# Patient Record
Sex: FEMALE | Race: WHITE | NOT HISPANIC OR LATINO | Employment: FULL TIME | URBAN - METROPOLITAN AREA
[De-identification: names, ages, dates, MRNs, and addresses within clinical notes are randomized per-mention and may not be internally consistent; named-entity substitution may affect disease eponyms.]

---

## 2018-01-04 ENCOUNTER — OFFICE VISIT (OUTPATIENT)
Dept: OTOLARYNGOLOGY | Facility: CLINIC | Age: 43
End: 2018-01-04
Payer: COMMERCIAL

## 2018-01-04 VITALS
TEMPERATURE: 99 F | DIASTOLIC BLOOD PRESSURE: 81 MMHG | HEART RATE: 83 BPM | WEIGHT: 121.69 LBS | SYSTOLIC BLOOD PRESSURE: 143 MMHG

## 2018-01-04 DIAGNOSIS — H93.291 ABNORMAL AUDITORY PERCEPTION OF RIGHT EAR: Primary | ICD-10-CM

## 2018-01-04 DIAGNOSIS — H91.90 PERCEIVED HEARING CHANGES: ICD-10-CM

## 2018-01-04 PROCEDURE — 99203 OFFICE O/P NEW LOW 30 MIN: CPT | Mod: S$GLB,,, | Performed by: PHYSICIAN ASSISTANT

## 2018-01-04 PROCEDURE — 99999 PR PBB SHADOW E&M-EST. PATIENT-LVL III: CPT | Mod: PBBFAC,,, | Performed by: PHYSICIAN ASSISTANT

## 2018-01-04 RX ORDER — ALBUTEROL SULFATE 90 UG/1
2 AEROSOL, METERED RESPIRATORY (INHALATION)
COMMUNITY
Start: 2017-08-17 | End: 2022-04-05

## 2018-01-04 RX ORDER — DEXTROAMPHETAMINE SACCHARATE, AMPHETAMINE ASPARTATE MONOHYDRATE, DEXTROAMPHETAMINE SULFATE AND AMPHETAMINE SULFATE 5; 5; 5; 5 MG/1; MG/1; MG/1; MG/1
20 CAPSULE, EXTENDED RELEASE ORAL 2 TIMES DAILY
COMMUNITY
Start: 2017-12-08 | End: 2020-06-25

## 2018-01-04 NOTE — PROGRESS NOTES
Subjective:   Patient: Germaine Gomez [id number], :1975   Visit date:2018 4:04 PM    Chief Complaint:  Ear Fullness (Right ear, some hearing difficulties also)    HPI:  Germaine FLOOD is a 42 y.o. female who is here to see me today for the first time for evaluation of a sloshing sound in her right ear, intermittent for the past 4-6 weeks.  She says she usually notices it when she gets out of bed during the night to go to bathroom.  It lasts only a few seconds.  She's concerned that she has fluid in her ear.  She had wax flushed from her right ear about 2 months ago.  Denies tinnitus or ringing.  She denies hearing loss but says she asks people to repeat themselves often (for over 6 months now) and she's unsure if this is just a habit.  Denies difference in hearing between her ears.  Denies dizziness currently but had severe episode about 12-18 months ago .  Denies previous otologic surgery.  Denies family history of hearing loss.  Denies loud noise exposure.  She denies grinding or clenching her teeth, but notes that her jaw pops when she opens her mouth for many years now.  Denies jaw pain.        Review of Systems:  Negative unless checked off.  Gen:  []fever   []fatigue  HENT:  []nosebleeds  [x]dental problem - jaw pops when she opens her mouth  Eyes:  []photophobia  []visual disturbance  Resp:  []chest tightness []wheezing  Card:  []chest pain  []leg swelling  GI:  []abdominal pain []blood in stool  :  []dysuria  []hematuria  Musc:  []joint swelling  []gait problem  Skin:  []color change  []pallor  Neuro:  []seizures  []numbness  Hem:  []bruise/bleed easily  Psych:  []hallucinations  []behavioral problems  Allergy/Imm: is allergic to bactrim [sulfamethoxazole-trimethoprim].    Her meds, allergies, medical, surgical, social & family histories were reviewed & updated:  -     She has a current medication list which includes the following prescription(s): albuterol and dextroamphetamine-amphetamine.  -     She   has no past medical history on file.   -     She  does not have a problem list on file.   -     She  has a past surgical history that includes Breast implants and Tummy tuck.  -     She  reports that she quit smoking about 20 years ago. She has never used smokeless tobacco. She reports that she drinks alcohol.  -     Her family history includes Hypertension in her mother.  -     She is allergic to bactrim [sulfamethoxazole-trimethoprim].    Objective:     Physical Exam:  Vitals:  BP (!) 143/81 (BP Location: Left arm)   Pulse 83   Temp 98.7 °F (37.1 °C) (Tympanic)   Wt 55.2 kg (121 lb 11.1 oz)   General appearance:  Well developed, well nourished    Eyes:  Extraocular motions intact, PERRL    Communication:  no hoarseness, no dysphonia    Ears:  Otoscopy of external auditory canals and tympanic membranes was normal, clinical speech reception thresholds grossly intact, no mass/lesion of auricle.  No middle ear effusions.  Nose:  No masses/lesions of external nose, nasal mucosa, septum, and turbinates were within normal limits.  Mouth:  No mass/lesion of lips, teeth, gums, hard/soft palate, tongue, tonsils, or oropharynx.    Cardiovascular:  Radial Pulses +2     Neck & Lymphatics:  No cervical lymphadenopathy, no neck mass/crepitus/ asymmetry, trachea is midline, no thyroid enlargement/tenderness/mass.    Psych: Oriented x3,  Alert with normal mood and affect.     Respiration/Chest:  Symmetric expansion during respiration, normal respiratory effort.    Skin:  Warm and intact. No ulcerations of face, scalp, neck.        Tympanograms today:  Left 1.0 mL @ -9 daPa, ECV 1.3 mL  Right 0.9 mL @ -9 daPa, ECV 1.2 mL      Assessment & Plan:   Germaine FLOOD was seen today for ear fullness.    Diagnoses and all orders for this visit:    Abnormal auditory perception of right ear    Perceived hearing changes      Reassured patient that her ear exam today is normal and her tympanograms are also normal.  No middle ear fluid and no  signs of otitis externa.  Recommend scheduling an audiogram at her convenience and I'll review those results once available.  Discussed trial of low salt, low caffeine.  She may benefit from EcoG if persists or has any additional dizziness.      We discussed her medical conditions, treatments and plan.  eGrmaine FLOOD should return to clinic if any issues arise (symptoms worsen or persist), otherwise we will see her back in the clinic only as needed.

## 2018-01-15 ENCOUNTER — CLINICAL SUPPORT (OUTPATIENT)
Dept: AUDIOLOGY | Facility: CLINIC | Age: 43
End: 2018-01-15
Payer: COMMERCIAL

## 2018-01-15 DIAGNOSIS — H92.01 OTALGIA OF RIGHT EAR: Primary | ICD-10-CM

## 2018-01-15 PROCEDURE — 92557 COMPREHENSIVE HEARING TEST: CPT | Mod: S$GLB,,, | Performed by: AUDIOLOGIST

## 2018-01-15 PROCEDURE — 92567 TYMPANOMETRY: CPT | Mod: S$GLB,,, | Performed by: AUDIOLOGIST

## 2018-01-15 NOTE — PROGRESS NOTES
Germaine Gomez was seen 01/15/2018 for an audiological evaluation.  Patient complains of otalgia and intermitting swishing sound in her right ear for the past two months.  She denies hearing loss and dizziness.  She was referred for audiogram by ARCHIE Hughes.    Results reveal normal hearing sensitivity 250-8000 Hz bilaterally. Bilateral air/bone gap was detected in the low frequencies.  Speech Reception Thresholds were  10 dBHL for the right ear and 10 dBHL for the left ear.   Word recognition scores were excellent bilaterally.  Tympanograms were Type A for the right ear and Type A for the left ear.    Patient was counseled on the above findings.    REC:  ENT review of audiogram

## 2018-01-16 ENCOUNTER — TELEPHONE (OUTPATIENT)
Dept: OTOLARYNGOLOGY | Facility: CLINIC | Age: 43
End: 2018-01-16

## 2018-01-16 NOTE — TELEPHONE ENCOUNTER
----- Message from Sabine Forbes sent at 1/16/2018 12:54 PM CST -----  Contact: pt   Pt is calling to speak with the nurse to get the results of her hearing test. Can you please call pt at [phone]    MARY

## 2018-01-18 NOTE — TELEPHONE ENCOUNTER
I conveyed the message below to the patient who verbalized understanding.  She states that she already was given the results but was told that someone would call her with the next step (possibly audiologist?).  She was told that due to the pressure behind her ear she would either be given medicine or ct scan.  I told her I would forward the message back to Isatu.  Please advise.

## 2018-01-18 NOTE — TELEPHONE ENCOUNTER
Spoke with patient who mentioned slight pressure in both ears over past 2 days.  She has Flonase at home.   We discussed in detail the proper mechanism of use directing the spray away from the nasal septum.  In addition, we also discussed that it will take two to three weeks of daily use to achieve maximal effectiveness.  The patient will please call in 2-3 weeks with their progress.

## 2020-06-25 ENCOUNTER — OFFICE VISIT (OUTPATIENT)
Dept: URGENT CARE | Facility: CLINIC | Age: 45
End: 2020-06-25
Payer: COMMERCIAL

## 2020-06-25 VITALS
SYSTOLIC BLOOD PRESSURE: 142 MMHG | OXYGEN SATURATION: 98 % | DIASTOLIC BLOOD PRESSURE: 93 MMHG | HEART RATE: 98 BPM | TEMPERATURE: 100 F

## 2020-06-25 DIAGNOSIS — Z20.822 EXPOSURE TO COVID-19 VIRUS: Primary | ICD-10-CM

## 2020-06-25 DIAGNOSIS — Z20.822 CLOSE EXPOSURE TO COVID-19 VIRUS: ICD-10-CM

## 2020-06-25 PROCEDURE — U0003 INFECTIOUS AGENT DETECTION BY NUCLEIC ACID (DNA OR RNA); SEVERE ACUTE RESPIRATORY SYNDROME CORONAVIRUS 2 (SARS-COV-2) (CORONAVIRUS DISEASE [COVID-19]), AMPLIFIED PROBE TECHNIQUE, MAKING USE OF HIGH THROUGHPUT TECHNOLOGIES AS DESCRIBED BY CMS-2020-01-R: HCPCS

## 2020-06-25 PROCEDURE — 99203 PR OFFICE/OUTPT VISIT, NEW, LEVL III, 30-44 MIN: ICD-10-PCS | Mod: S$GLB,,, | Performed by: NURSE PRACTITIONER

## 2020-06-25 PROCEDURE — 99203 OFFICE O/P NEW LOW 30 MIN: CPT | Mod: S$GLB,,, | Performed by: NURSE PRACTITIONER

## 2020-06-25 RX ORDER — BUSPIRONE HYDROCHLORIDE 10 MG/1
10 TABLET ORAL 3 TIMES DAILY PRN
COMMUNITY
Start: 2020-03-11 | End: 2022-04-05

## 2020-06-25 RX ORDER — DEXTROAMPHETAMINE SACCHARATE, AMPHETAMINE ASPARTATE MONOHYDRATE, DEXTROAMPHETAMINE SULFATE AND AMPHETAMINE SULFATE 6.25; 6.25; 6.25; 6.25 MG/1; MG/1; MG/1; MG/1
25 CAPSULE, EXTENDED RELEASE ORAL
COMMUNITY
Start: 2020-06-04 | End: 2020-08-03

## 2020-06-25 RX ORDER — SERTRALINE HYDROCHLORIDE 50 MG/1
50 TABLET, FILM COATED ORAL
COMMUNITY
Start: 2020-04-20 | End: 2022-04-05

## 2020-06-25 NOTE — PROGRESS NOTES
Subjective:       Patient ID: Germaine Gomez is a 45 y.o. female.    Vitals:  tympanic temperature is 99.5 °F (37.5 °C). Her blood pressure is 142/93 (abnormal) and her pulse is 98. Her oxygen saturation is 98%.     Chief Complaint: COVID-19 Concerns    PATIENT RECENTLY EXPOSED TO COVID POSITIVE PERSONS.       Constitution: Negative for chills, fatigue and fever.   HENT: Positive for postnasal drip. Negative for congestion and sore throat.    Neck: Negative for painful lymph nodes.   Cardiovascular: Negative for chest pain and leg swelling.   Eyes: Negative for double vision and blurred vision.   Respiratory: Negative for cough and shortness of breath.    Gastrointestinal: Negative for nausea, vomiting and diarrhea.   Genitourinary: Negative for dysuria, frequency, urgency and history of kidney stones.   Musculoskeletal: Negative for joint pain, joint swelling, muscle cramps and muscle ache.   Skin: Negative for color change, pale, rash and bruising.   Allergic/Immunologic: Negative for seasonal allergies.   Neurological: Negative for dizziness, history of vertigo, light-headedness, passing out and headaches.   Hematologic/Lymphatic: Negative for swollen lymph nodes.   Psychiatric/Behavioral: Negative for nervous/anxious, sleep disturbance and depression. The patient is not nervous/anxious.        Objective:      Physical Exam   Constitutional: She is oriented to person, place, and time. She appears well-developed. She is cooperative.  Non-toxic appearance. She does not appear ill. No distress.   HENT:   Head: Normocephalic and atraumatic.   Right Ear: Hearing, tympanic membrane, external ear and ear canal normal.   Left Ear: Hearing, tympanic membrane, external ear and ear canal normal.   Nose: Nose normal. No mucosal edema, rhinorrhea or nasal deformity. No epistaxis. Right sinus exhibits no maxillary sinus tenderness and no frontal sinus tenderness. Left sinus exhibits no maxillary sinus tenderness and no  frontal sinus tenderness.   Mouth/Throat: Uvula is midline, oropharynx is clear and moist and mucous membranes are normal. No trismus in the jaw. Normal dentition. No uvula swelling. No oropharyngeal exudate, posterior oropharyngeal edema or posterior oropharyngeal erythema.   Eyes: Conjunctivae and lids are normal. No scleral icterus.   Neck: Trachea normal, full passive range of motion without pain and phonation normal. Neck supple. No neck rigidity. No edema and no erythema present.   Cardiovascular: Normal rate, regular rhythm, normal heart sounds and normal pulses.   Pulmonary/Chest: Effort normal and breath sounds normal. No respiratory distress. She has no decreased breath sounds. She has no rhonchi.   Abdominal: Normal appearance.   Musculoskeletal: Normal range of motion.         General: No deformity.   Neurological: She is alert and oriented to person, place, and time. She exhibits normal muscle tone. Coordination normal.   Skin: Skin is warm, dry, intact, not diaphoretic and not pale.   Psychiatric: Her speech is normal and behavior is normal. Judgment and thought content normal.   Nursing note and vitals reviewed.        Assessment:       1. Exposure to Covid-19 Virus        Plan:         Exposure to Covid-19 Virus         Will call with results. CDC guidelines reviewed.

## 2020-06-25 NOTE — PATIENT INSTRUCTIONS

## 2020-06-29 LAB — SARS-COV-2 RNA RESP QL NAA+PROBE: NOT DETECTED

## 2020-09-03 ENCOUNTER — OFFICE VISIT (OUTPATIENT)
Dept: OPHTHALMOLOGY | Facility: CLINIC | Age: 45
End: 2020-09-03
Payer: COMMERCIAL

## 2020-09-03 DIAGNOSIS — H52.13 MYOPIA WITH PRESBYOPIA, BILATERAL: ICD-10-CM

## 2020-09-03 DIAGNOSIS — H52.4 MYOPIA WITH PRESBYOPIA, BILATERAL: ICD-10-CM

## 2020-09-03 DIAGNOSIS — H53.10 SUBJECTIVE VISUAL DISTURBANCE: Primary | ICD-10-CM

## 2020-09-03 PROCEDURE — 92015 PR REFRACTION: ICD-10-PCS | Mod: S$GLB,,, | Performed by: OPTOMETRIST

## 2020-09-03 PROCEDURE — 92004 PR EYE EXAM, NEW PATIENT,COMPREHESV: ICD-10-PCS | Mod: S$GLB,,, | Performed by: OPTOMETRIST

## 2020-09-03 PROCEDURE — 92004 COMPRE OPH EXAM NEW PT 1/>: CPT | Mod: S$GLB,,, | Performed by: OPTOMETRIST

## 2020-09-03 PROCEDURE — 99999 PR PBB SHADOW E&M-EST. PATIENT-LVL II: ICD-10-PCS | Mod: PBBFAC,,, | Performed by: OPTOMETRIST

## 2020-09-03 PROCEDURE — 92015 DETERMINE REFRACTIVE STATE: CPT | Mod: S$GLB,,, | Performed by: OPTOMETRIST

## 2020-09-03 PROCEDURE — 99999 PR PBB SHADOW E&M-EST. PATIENT-LVL II: CPT | Mod: PBBFAC,,, | Performed by: OPTOMETRIST

## 2020-09-03 NOTE — PROGRESS NOTES
HPI     Eye Exam     Comments: Yearly              Comments     NP to [facility]  Patient here today for yearly eye exam  HPI    Any vision changes since last exam: Patient states she has been noticing   she's having trouble with her peripheral vision   Eye pain: No  Other ocular symptoms: No    Do you wear currently wear glasses or contacts? CTL    Interested in contacts today? Yes    Do you plan on getting new glasses today? If needed                Last edited by Luisana Dawson, PCT on 9/3/2020 10:12 AM. (History)            Assessment /Plan     For exam results, see Encounter Report.    Subjective visual disturbance  She reports recently bumping into walls/door sill on right side  She has a healing scrape near her right eye brow   Also decreased BCVA OD, OS  Normal CF, but will get baseline 24-2VF    Myopia with presbyopia, bilateral  Eyeglass Final Rx     Eyeglass Final Rx       Sphere    Right -4.75    Left -4.25    Expiration Date: 9/4/2021              Contact [facility] (9/3/2020)        Brand Diameter Sphere    Right Fresh Look Color Blends 14.5 -3.75    Left Fresh Look Color Blends 14.5 -3.75    Expiration Date: 9/4/2021    Replacement: Monthly    Wearing Schedule: Daily wear            RTC next available for 24-2VF and gOCT with review or PRN if any problems.   Discussed above and answered questions.

## 2021-04-28 ENCOUNTER — PATIENT MESSAGE (OUTPATIENT)
Dept: RESEARCH | Facility: HOSPITAL | Age: 46
End: 2021-04-28